# Patient Record
Sex: FEMALE
[De-identification: names, ages, dates, MRNs, and addresses within clinical notes are randomized per-mention and may not be internally consistent; named-entity substitution may affect disease eponyms.]

---

## 2020-03-10 PROBLEM — Z00.00 ENCOUNTER FOR PREVENTIVE HEALTH EXAMINATION: Status: ACTIVE | Noted: 2020-03-10

## 2020-03-16 ENCOUNTER — APPOINTMENT (OUTPATIENT)
Dept: ENDOCRINOLOGY | Facility: CLINIC | Age: 45
End: 2020-03-16
Payer: MEDICAID

## 2020-03-16 VITALS
DIASTOLIC BLOOD PRESSURE: 80 MMHG | BODY MASS INDEX: 36.82 KG/M2 | HEART RATE: 104 BPM | SYSTOLIC BLOOD PRESSURE: 123 MMHG | HEIGHT: 61 IN | WEIGHT: 195 LBS

## 2020-03-16 DIAGNOSIS — E66.9 OBESITY, UNSPECIFIED: ICD-10-CM

## 2020-03-16 PROCEDURE — 99205 OFFICE O/P NEW HI 60 MIN: CPT

## 2020-03-16 RX ORDER — DEXAMETHASONE 1 MG/1
1 TABLET ORAL
Qty: 1 | Refills: 0 | Status: ACTIVE | COMMUNITY
Start: 2020-03-16 | End: 1900-01-01

## 2020-03-17 NOTE — END OF VISIT
[>50% of Time Spent on Counseling for ____] : Greater than 50% of the encounter time was spent on counseling for [unfilled] [Time Spent: ___ minutes] : I have spent [unfilled] minutes of face to face time with the patient [FreeTextEntry3] : All medical record entries made by the Scribe were at my, Dr. Ari Benoit, direction and personally dictated by me on 03/16/2020. I have reviewed the chart and agree that the record accurately reflects my personal performance of the history, physical exam, assessment and plan. I have also personally directed, reviewed and agreed with the chart.

## 2020-03-17 NOTE — ADDENDUM
[FreeTextEntry1] : I, Bernardo Walton, acted solely as a scribe for Dr. Ari Benoit on this date. 03/16/2020.

## 2020-03-17 NOTE — REVIEW OF SYSTEMS
[Fatigue] : fatigue [Recent Weight Gain (___ Lbs)] : recent [unfilled] ~Ulb weight gain [Headache] : headaches [As Noted in HPI] : as noted in HPI [Galactorrhea] : galactorrhea  [Negative] : Psychiatric [Visual Field Defect] : no visual field defect [de-identified] : bruising easily  [de-identified] : forgetfulness and memory loss

## 2020-03-17 NOTE — ASSESSMENT
[FreeTextEntry1] : 45 y/o F pt with:\par 1. Hx of galactorrhea:\par Pt delivered twins in 2016 and is on Lexapro and Adderall which are associated with elevated prolactin. Pt had a routine OB/GYN visit and stated her next appointment would be in a couple of months. \par Pt has normal menses. Will order prolactin levels.\par \par 2. Hx of obesity, BMI 36.85:\par Pt is concerned with Cushing syndrome, will consider subclinical/full blown Cushing syndrome with screening test and dexamethasone suppression test.\par \par 3. Cushingoid  facies\par Dexamethasone screen test\par \par Return in 4/2020.

## 2020-03-17 NOTE — HISTORY OF PRESENT ILLNESS
[FreeTextEntry1] : 43 y/o F pt, with Hx of galactorrhea and Hx of low Testosterone (in 2005 as per pt), referred by Dr. Sudhakar Jon, presents today to establish endocrine care with me.\par Other PMHx:  depression, ADHD, autism (pt states it was never formerly diagnosed), obesity, chronic sinus infections (as per pt)\par Denies irregular menses\par PSHx: dermoid removal on ovaries x 3 (in 2002, 2005, 2008), L oophorectomy (in 2005), sinus surgery cyst removal (in 2010)\par Denies FHx of pituitary disorder\par SHx: no tobacco/etOH use; works as an  \par Menarche at age 10\par Sees therapist/psychiatrist\par Last OB/GYN visit: in 2019\par \par Pt states she was pregnant once at age 20.. She notes she had IVF with twins, and gave birth in 10/2016. Pt states she finished breast feeding however she is still lactating. She notes she had a CT scan in the past for her pelvis and sinuses.\par In addition, pt states in 2005 she had low testosterone and her OB/GYN gave her testosterone cream.\par \par 3/16/20\par Today pt presents feeling well with c/o low energy, weight gain, occasional chronic sinus headaches, bruising easily which has been occurring more than 1 yr, memory loss, "has trouble accessing words", galactorrhea\par Pt states she is fairly active; she notes she was under 120lbs at age 26 and in 3/2016 pt was 147lb. After giving birth to her twins, she was back to 145lb and she is now 195lbs.\par Denies visual disturbances, weakness,polyuria. \par \par Current Medications: Lexapro 30mg, Adderall 30 - 60 mg, Hydroxyzine 20 - 50mg

## 2020-03-17 NOTE — PHYSICAL EXAM
[Alert] : alert [Normal Sclera/Conjunctiva] : normal sclera/conjunctiva [Normal Outer Ear/Nose] : the ears and nose were normal in appearance [No Neck Mass] : no neck mass was observed [No Respiratory Distress] : no respiratory distress [Clear to Auscultation] : lungs were clear to auscultation bilaterally [Normal Rate] : heart rate was normal  [Normal S1, S2] : normal S1 and S2 [Pedal Pulses Normal] : the pedal pulses are present [Normal Bowel Sounds] : normal bowel sounds [Spine Straight] : spine straight [Normal Gait] : normal gait [No Rash] : no rash [Normal Reflexes] : deep tendon reflexes were 2+ and symmetric [Oriented x3] : oriented to person, place, and time [de-identified] : no buffalo hump

## 2020-03-27 LAB
ACTH SER-ACNC: 1.8 PG/ML
ALBUMIN SERPL ELPH-MCNC: 4.6 G/DL
ALP BLD-CCNC: 92 U/L
ALT SERPL-CCNC: 21 U/L
ANION GAP SERPL CALC-SCNC: 13 MMOL/L
AST SERPL-CCNC: 17 U/L
BILIRUB SERPL-MCNC: 0.3 MG/DL
BUN SERPL-MCNC: 11 MG/DL
CALCIUM SERPL-MCNC: 9.6 MG/DL
CHLORIDE SERPL-SCNC: 103 MMOL/L
CO2 SERPL-SCNC: 25 MMOL/L
CORTIS SERPL-MCNC: 1 UG/DL
CREAT SERPL-MCNC: 0.8 MG/DL
GLUCOSE SERPL-MCNC: 101 MG/DL
POTASSIUM SERPL-SCNC: 4.4 MMOL/L
PROLACTIN SERPL-MCNC: 15.4 NG/ML
PROT SERPL-MCNC: 7.3 G/DL
SODIUM SERPL-SCNC: 141 MMOL/L
TSH SERPL-ACNC: 1.27 UIU/ML

## 2020-04-02 ENCOUNTER — TRANSCRIPTION ENCOUNTER (OUTPATIENT)
Age: 45
End: 2020-04-02

## 2020-04-02 ENCOUNTER — APPOINTMENT (OUTPATIENT)
Dept: ENDOCRINOLOGY | Facility: CLINIC | Age: 45
End: 2020-04-02
Payer: MEDICAID

## 2020-04-02 VITALS
BODY MASS INDEX: 36.66 KG/M2 | DIASTOLIC BLOOD PRESSURE: 79 MMHG | SYSTOLIC BLOOD PRESSURE: 126 MMHG | WEIGHT: 194 LBS | HEART RATE: 81 BPM

## 2020-04-02 DIAGNOSIS — N64.3 GALACTORRHEA NOT ASSOCIATED WITH CHILDBIRTH: ICD-10-CM

## 2020-04-02 DIAGNOSIS — R68.89 OTHER GENERAL SYMPTOMS AND SIGNS: ICD-10-CM

## 2020-04-02 LAB — DEXAMETHASONE LEVEL: NORMAL

## 2020-04-02 PROCEDURE — 99214 OFFICE O/P EST MOD 30 MIN: CPT

## 2020-04-02 NOTE — HISTORY OF PRESENT ILLNESS
[FreeTextEntry1] : 43 y/o F pt, with Hx of galactorrhea and Hx of low Testosterone (in 2005 as per pt), referred by Dr. Sudhakar Jon, presents today to establish endocrine care with me.\par Other PMHx:  depression, ADHD, autism (pt states it was never formerly diagnosed), obesity, chronic sinus infections (as per pt)\par Denies irregular menses\par PSHx: dermoid removal on ovaries x 3 (in 2002, 2005, 2008), L oophorectomy (in 2005), sinus surgery cyst removal (in 2010)\par Denies FHx of pituitary disorder\par SHx: no tobacco/etOH use; works as an  \par Menarche at age 10\par Sees therapist/psychiatrist\par Last OB/GYN visit: in 2019\par \par Pt states she was pregnant once at age 20.. She notes she had IVF with twins, and gave birth in 10/2016. Pt states she finished breast feeding however she is still lactating. She notes she had a CT scan in the past for her pelvis and sinuses.\par In addition, pt states in 2005 she had low testosterone and her OB/GYN gave her testosterone cream.\par \par 3/16/20\par Today pt presents feeling well with c/o low energy, weight gain, occasional chronic sinus headaches, bruising easily which has been occurring more than 1 yr, memory loss, "has trouble accessing words", galactorrhea\par Pt states she is fairly active; she notes she was under 120lbs at age 26 and in 3/2016 pt was 147lb. After giving birth to her twins, she was back to 145lb and she is now 195lbs.\par Denies visual disturbances, weakness,polyuria. \par \par 4/2/2020\par  She presents today for labs report and recommendations\par  OBGYN visit 2019, mammogram Jan 2020\par  Clinically unchanged\par  3/24/2020, prolactin levels  is normal, 15.4, and dexamethasone cortisol suppression test, normal\par \par Current Medications: Lexapro 30mg, Adderall 30 - 60 mg, Hydroxyzine 20 - 50mg

## 2020-04-02 NOTE — ASSESSMENT
[FreeTextEntry1] : 45 y/o F pt with:\par 1. Hx of galactorrhea:\par Prolactin levels is normal.Saw OBGYN in 2019 and had mammogram Jan 2020.\par Return in 1 year\par \par 2.Cushingoid  facies\par Dexamethasone suppression test : Cortisol 1. 4/24/2020\par Recommend lifestyle and diet improvement [Importance of Diet and Exercise] : importance of diet and exercise to improve glycemic control, achieve weight loss and improve cardiovascular health

## 2020-04-02 NOTE — PHYSICAL EXAM
[Alert] : alert [No Neck Mass] : no neck mass was observed [No Respiratory Distress] : no respiratory distress [Clear to Auscultation] : lungs were clear to auscultation bilaterally [Normal Rate] : heart rate was normal  [Normal S1, S2] : normal S1 and S2 [Pedal Pulses Normal] : the pedal pulses are present [Normal Bowel Sounds] : normal bowel sounds [Spine Straight] : spine straight [Normal Gait] : normal gait [No Rash] : no rash [Normal Reflexes] : deep tendon reflexes were 2+ and symmetric [Oriented x3] : oriented to person, place, and time [de-identified] : no buffalo hump